# Patient Record
Sex: FEMALE | Race: WHITE | Employment: OTHER | URBAN - NONMETROPOLITAN AREA
[De-identification: names, ages, dates, MRNs, and addresses within clinical notes are randomized per-mention and may not be internally consistent; named-entity substitution may affect disease eponyms.]

---

## 2017-05-23 ENCOUNTER — HOSPITAL ENCOUNTER (OUTPATIENT)
Dept: ULTRASOUND IMAGING | Age: 67
Discharge: HOME | End: 2017-05-23
Payer: MEDICARE

## 2017-05-23 DIAGNOSIS — N28.1 SIMPLE RENAL CYST: ICD-10-CM

## 2017-05-23 PROCEDURE — 76770 US EXAM ABDO BACK WALL COMP: CPT

## 2017-06-13 ENCOUNTER — HOSPITAL ENCOUNTER (OUTPATIENT)
Dept: GENERAL RADIOLOGY | Age: 67
Discharge: HOME | End: 2017-06-13
Payer: MEDICARE

## 2017-06-13 DIAGNOSIS — R05.9 COUGH: ICD-10-CM

## 2017-06-13 PROCEDURE — 71020 XR CHEST STANDARD TWO VW: CPT

## 2017-06-20 RX ORDER — ALPRAZOLAM 1 MG/1
1 TABLET ORAL 2 TIMES DAILY PRN
COMMUNITY

## 2017-06-20 RX ORDER — CHLORAL HYDRATE 500 MG
CAPSULE ORAL
COMMUNITY

## 2017-06-20 RX ORDER — LOSARTAN POTASSIUM 50 MG/1
50 TABLET ORAL DAILY
COMMUNITY
End: 2022-04-07 | Stop reason: SDUPTHER

## 2017-06-20 RX ORDER — ASCORBIC ACID 500 MG
500 TABLET ORAL DAILY
COMMUNITY

## 2017-06-20 RX ORDER — SIMVASTATIN 10 MG
10 TABLET ORAL NIGHTLY
COMMUNITY

## 2017-06-20 RX ORDER — ASPIRIN 81 MG/1
81 TABLET ORAL DAILY
COMMUNITY

## 2017-06-21 ENCOUNTER — OFFICE VISIT (OUTPATIENT)
Dept: GASTROENTEROLOGY | Facility: CLINIC | Age: 67
End: 2017-06-21

## 2017-06-21 VITALS
BODY MASS INDEX: 25.03 KG/M2 | HEART RATE: 76 BPM | SYSTOLIC BLOOD PRESSURE: 130 MMHG | TEMPERATURE: 97.6 F | DIASTOLIC BLOOD PRESSURE: 70 MMHG | HEIGHT: 69 IN | WEIGHT: 169 LBS

## 2017-06-21 DIAGNOSIS — K21.9 GASTROESOPHAGEAL REFLUX DISEASE, ESOPHAGITIS PRESENCE NOT SPECIFIED: Primary | ICD-10-CM

## 2017-06-21 PROCEDURE — 99213 OFFICE O/P EST LOW 20 MIN: CPT | Performed by: NURSE PRACTITIONER

## 2017-06-21 RX ORDER — OMEPRAZOLE 40 MG/1
40 CAPSULE, DELAYED RELEASE ORAL 2 TIMES DAILY
COMMUNITY
End: 2022-10-28 | Stop reason: SDUPTHER

## 2017-06-21 NOTE — PROGRESS NOTES
Chief Complaint   Patient presents with   • Endoscopy     12-24-09 had endo healed gastric ulcer       Subjective     HPI    Last EGD in 2009 showed healed gastric ulcers.  Currently maintained on Prilosec 40 mg bid.  She takes Prilosec on empty stomach.  She has not experienced increase in heartburn or indigestion.  She denies dysphagia or odynophagia.  No BRBPR or melena stools.  No abdominal pain.  She does c/o tickle in her throat that is improving since being placed on antibiotics.  EGD in 2016 cancelled due to improvement in symptoms after increasing PPI to bid.    CScope (Dr Hurtado) 2010 normal procedure (reviewed with pt)      Past Medical History:   Diagnosis Date   • Hypertension        Past Surgical History:   Procedure Laterality Date   • D&C AND LAPAROSCOPY         Outpatient Prescriptions Marked as Taking for the 6/21/17 encounter (Office Visit) with JOEY Ricketts   Medication Sig Dispense Refill   • ALPRAZolam (XANAX) 1 MG tablet Take 1 mg by mouth 2 (Two) Times a Day As Needed for Anxiety.     • aspirin 81 MG EC tablet Take 81 mg by mouth Daily.     • losartan (COZAAR) 50 MG tablet Take 50 mg by mouth Daily.     • omeprazole (priLOSEC) 40 MG capsule Take 40 mg by mouth 2 (Two) Times a Day.     • simvastatin (ZOCOR) 10 MG tablet Take 10 mg by mouth Every Night.         No Known Allergies    Social History     Social History   • Marital status:      Spouse name: N/A   • Number of children: N/A   • Years of education: N/A     Occupational History   • Not on file.     Social History Main Topics   • Smoking status: Never Smoker   • Smokeless tobacco: Never Used   • Alcohol use Yes      Comment: sometime   • Drug use: No   • Sexual activity: Not on file     Other Topics Concern   • Not on file     Social History Narrative       Family History   Problem Relation Age of Onset   • Colon cancer Neg Hx    • Esophageal cancer Neg Hx        Review of Systems   Constitutional: Negative for  "fatigue, fever and unexpected weight change.   HENT: Negative for hearing loss, sore throat and voice change.    Eyes: Negative for visual disturbance.   Respiratory: Negative for cough, shortness of breath and wheezing.    Cardiovascular: Negative for chest pain and palpitations.   Gastrointestinal: Negative for abdominal pain, blood in stool and vomiting.   Endocrine: Negative for polydipsia and polyuria.   Genitourinary: Negative for difficulty urinating, dysuria, hematuria and urgency.   Musculoskeletal: Negative for joint swelling and myalgias.   Skin: Negative for color change, rash and wound.   Neurological: Negative for dizziness, tremors, seizures and syncope.   Hematological: Does not bruise/bleed easily.   Psychiatric/Behavioral: Negative for agitation and confusion. The patient is not nervous/anxious.        Objective     Vitals:    06/21/17 0946   BP: 130/70   Pulse: 76   Temp: 97.6 °F (36.4 °C)   Weight: 169 lb (76.7 kg)   Height: 69\" (175.3 cm)     Body mass index is 24.96 kg/(m^2).    Physical Exam   Constitutional: She is oriented to person, place, and time. She appears well-developed and well-nourished.   HENT:   Head: Normocephalic and atraumatic.   Eyes: Conjunctivae are normal. Pupils are equal, round, and reactive to light. No scleral icterus.   Neck: No JVD present. No thyroid mass and no thyromegaly present.   Cardiovascular: Normal rate, regular rhythm and normal heart sounds.  Exam reveals no gallop and no friction rub.    No murmur heard.  Pulmonary/Chest: Effort normal and breath sounds normal. No accessory muscle usage. No respiratory distress. She has no wheezes. She has no rales.   Abdominal: Soft. Bowel sounds are normal. She exhibits no distension, no ascites and no mass. There is no splenomegaly or hepatomegaly. There is no tenderness. There is no rebound and no guarding.   Genitourinary:   Genitourinary Comments: Rectal-Did not examine   Musculoskeletal: Normal range of motion. She " exhibits no edema.   Neurological: She is alert and oriented to person, place, and time.   Deemed a reliable historian, able to converse without difficulty and able to move all extremities without difficulty   Skin: Skin is warm and dry.   Psychiatric: She has a normal mood and affect. Her behavior is normal.       Imaging Results (most recent)     None          Assessment/Plan     Veronica was seen today for endoscopy.    Diagnoses and all orders for this visit:    Gastroesophageal reflux disease, esophagitis presence not specified    No plans for endoscopic eval at this time, if symptoms worsen/do not improve pt will call office and we will proceed with endoscopy at that time.  This discussed with pt and she was agreeable.  She is to continue bid Prilosec    * Surgery not found *    There are no Patient Instructions on file for this visit.

## 2018-10-11 LAB
ALBUMIN SERPL-MCNC: 4.5 G/DL (ref 3.5–5.2)
ALP BLD-CCNC: 95 U/L (ref 35–104)
ALT SERPL-CCNC: 27 U/L (ref 5–33)
ANION GAP SERPL CALCULATED.3IONS-SCNC: 15 MMOL/L (ref 7–19)
AST SERPL-CCNC: 28 U/L (ref 5–32)
BASOPHILS ABSOLUTE: 0 K/UL (ref 0–0.2)
BASOPHILS RELATIVE PERCENT: 0.7 % (ref 0–1)
BILIRUB SERPL-MCNC: 0.5 MG/DL (ref 0.2–1.2)
BUN BLDV-MCNC: 9 MG/DL (ref 8–23)
CALCIUM SERPL-MCNC: 10.1 MG/DL (ref 8.8–10.2)
CHLORIDE BLD-SCNC: 102 MMOL/L (ref 98–111)
CHOLESTEROL, TOTAL: 180 MG/DL (ref 160–199)
CO2: 26 MMOL/L (ref 22–29)
CREAT SERPL-MCNC: 0.6 MG/DL (ref 0.5–0.9)
EOSINOPHILS ABSOLUTE: 0.1 K/UL (ref 0–0.6)
EOSINOPHILS RELATIVE PERCENT: 1.4 % (ref 0–5)
GFR NON-AFRICAN AMERICAN: >60
GLUCOSE BLD-MCNC: 109 MG/DL (ref 74–109)
HCT VFR BLD CALC: 46.8 % (ref 37–47)
HDLC SERPL-MCNC: 64 MG/DL (ref 65–121)
HEMOGLOBIN: 15.2 G/DL (ref 12–16)
LDL CHOLESTEROL CALCULATED: 89 MG/DL
LYMPHOCYTES ABSOLUTE: 1.8 K/UL (ref 1.1–4.5)
LYMPHOCYTES RELATIVE PERCENT: 31 % (ref 20–40)
MCH RBC QN AUTO: 30.8 PG (ref 27–31)
MCHC RBC AUTO-ENTMCNC: 32.5 G/DL (ref 33–37)
MCV RBC AUTO: 94.7 FL (ref 81–99)
MONOCYTES ABSOLUTE: 0.7 K/UL (ref 0–0.9)
MONOCYTES RELATIVE PERCENT: 12.5 % (ref 0–10)
NEUTROPHILS ABSOLUTE: 3.2 K/UL (ref 1.5–7.5)
NEUTROPHILS RELATIVE PERCENT: 54.2 % (ref 50–65)
PDW BLD-RTO: 12.8 % (ref 11.5–14.5)
PLATELET # BLD: 152 K/UL (ref 130–400)
PMV BLD AUTO: 11.4 FL (ref 9.4–12.3)
POTASSIUM SERPL-SCNC: 4 MMOL/L (ref 3.5–5)
RBC # BLD: 4.94 M/UL (ref 4.2–5.4)
SODIUM BLD-SCNC: 143 MMOL/L (ref 136–145)
TOTAL PROTEIN: 7.8 G/DL (ref 6.6–8.7)
TRIGL SERPL-MCNC: 137 MG/DL (ref 0–149)
TSH SERPL DL<=0.05 MIU/L-ACNC: 0.99 UIU/ML (ref 0.27–4.2)
WBC # BLD: 5.8 K/UL (ref 4.8–10.8)

## 2019-04-04 LAB
ALBUMIN SERPL-MCNC: 4.6 G/DL (ref 3.5–5.2)
ALP BLD-CCNC: 87 U/L (ref 35–104)
ALT SERPL-CCNC: 19 U/L (ref 5–33)
ANION GAP SERPL CALCULATED.3IONS-SCNC: 15 MMOL/L (ref 7–19)
AST SERPL-CCNC: 21 U/L (ref 5–32)
BILIRUB SERPL-MCNC: 0.7 MG/DL (ref 0.2–1.2)
BUN BLDV-MCNC: 11 MG/DL (ref 8–23)
CALCIUM SERPL-MCNC: 9.8 MG/DL (ref 8.8–10.2)
CHLORIDE BLD-SCNC: 101 MMOL/L (ref 98–111)
CHOLESTEROL, TOTAL: 168 MG/DL (ref 160–199)
CO2: 26 MMOL/L (ref 22–29)
CREAT SERPL-MCNC: 0.8 MG/DL (ref 0.5–0.9)
GFR NON-AFRICAN AMERICAN: >60
GLUCOSE BLD-MCNC: 109 MG/DL (ref 74–109)
HDLC SERPL-MCNC: 64 MG/DL (ref 65–121)
LDL CHOLESTEROL CALCULATED: 77 MG/DL
POTASSIUM SERPL-SCNC: 3.9 MMOL/L (ref 3.5–5)
SODIUM BLD-SCNC: 142 MMOL/L (ref 136–145)
TOTAL PROTEIN: 7.9 G/DL (ref 6.6–8.7)
TRIGL SERPL-MCNC: 133 MG/DL (ref 0–149)

## 2019-10-03 LAB
ALBUMIN SERPL-MCNC: 4.5 G/DL (ref 3.5–5.2)
ALP BLD-CCNC: 85 U/L (ref 35–104)
ALT SERPL-CCNC: 16 U/L (ref 5–33)
ANION GAP SERPL CALCULATED.3IONS-SCNC: 14 MMOL/L (ref 7–19)
AST SERPL-CCNC: 19 U/L (ref 5–32)
BILIRUB SERPL-MCNC: 0.4 MG/DL (ref 0.2–1.2)
BUN BLDV-MCNC: 10 MG/DL (ref 8–23)
CALCIUM SERPL-MCNC: 9.7 MG/DL (ref 8.8–10.2)
CHLORIDE BLD-SCNC: 105 MMOL/L (ref 98–111)
CHOLESTEROL, TOTAL: 179 MG/DL (ref 160–199)
CO2: 25 MMOL/L (ref 22–29)
CREAT SERPL-MCNC: 0.7 MG/DL (ref 0.5–0.9)
GFR NON-AFRICAN AMERICAN: >60
GLUCOSE BLD-MCNC: 95 MG/DL (ref 74–109)
HDLC SERPL-MCNC: 65 MG/DL (ref 65–121)
LDL CHOLESTEROL CALCULATED: 92 MG/DL
POTASSIUM SERPL-SCNC: 4.3 MMOL/L (ref 3.5–5)
SODIUM BLD-SCNC: 144 MMOL/L (ref 136–145)
TOTAL PROTEIN: 7.8 G/DL (ref 6.6–8.7)
TRIGL SERPL-MCNC: 111 MG/DL (ref 0–149)

## 2022-04-07 ENCOUNTER — OFFICE VISIT (OUTPATIENT)
Dept: FAMILY MEDICINE CLINIC | Facility: CLINIC | Age: 72
End: 2022-04-07

## 2022-04-07 VITALS
BODY MASS INDEX: 23.25 KG/M2 | DIASTOLIC BLOOD PRESSURE: 74 MMHG | HEIGHT: 69 IN | RESPIRATION RATE: 16 BRPM | SYSTOLIC BLOOD PRESSURE: 130 MMHG | WEIGHT: 157 LBS | HEART RATE: 70 BPM | TEMPERATURE: 98.8 F

## 2022-04-07 DIAGNOSIS — G89.29 CHRONIC RIGHT SHOULDER PAIN: Primary | ICD-10-CM

## 2022-04-07 DIAGNOSIS — M25.511 CHRONIC RIGHT SHOULDER PAIN: Primary | ICD-10-CM

## 2022-04-07 PROCEDURE — 99202 OFFICE O/P NEW SF 15 MIN: CPT | Performed by: FAMILY MEDICINE

## 2022-04-07 RX ORDER — CYCLOBENZAPRINE HCL 10 MG
10 TABLET ORAL AS NEEDED
COMMUNITY

## 2022-04-07 RX ORDER — LOSARTAN POTASSIUM 50 MG/1
50 TABLET ORAL DAILY
Qty: 90 TABLET | Refills: 4 | Status: SHIPPED | OUTPATIENT
Start: 2022-04-07

## 2022-04-07 NOTE — PROGRESS NOTES
"Subjective   Veronica Camacho is a 72 y.o. female.     Chief Complaint   Patient presents with   • Establish Care       History of Present Illness     she notes having right shouder pain since a fall last year--worse with amny mvoement      Current Outpatient Medications:   •  ALPRAZolam (XANAX) 1 MG tablet, Take 1 mg by mouth 2 (Two) Times a Day As Needed for Anxiety., Disp: , Rfl:   •  aspirin 81 MG EC tablet, Take 81 mg by mouth Daily., Disp: , Rfl:   •  losartan (COZAAR) 50 MG tablet, Take 50 mg by mouth Daily., Disp: , Rfl:   •  omeprazole (priLOSEC) 40 MG capsule, Take 40 mg by mouth 2 (Two) Times a Day., Disp: , Rfl:   •  simvastatin (ZOCOR) 10 MG tablet, Take 10 mg by mouth Every Night., Disp: , Rfl:   •  cyclobenzaprine (FLEXERIL) 10 MG tablet, Take 10 mg by mouth As Needed for Muscle Spasms., Disp: , Rfl:   •  Omega-3 Fatty Acids (FISH OIL) 1000 MG capsule capsule, Take  by mouth Daily With Breakfast., Disp: , Rfl:   •  vitamin C (ASCORBIC ACID) 500 MG tablet, Take 500 mg by mouth Daily., Disp: , Rfl:   •  vitamin E 100 UNIT capsule, Take 100 Units by mouth Daily., Disp: , Rfl:   No Known Allergies    Patient's Body mass index is 23.18 kg/m². indicating that she is within normal range (BMI 18.5-24.9). No BMI management plan needed..      Past Medical History:   Diagnosis Date   • Hypertension      Past Surgical History:   Procedure Laterality Date   • D & C AND LAPAROSCOPY         Review of Systems   Constitutional: Negative.    HENT: Negative.    Eyes: Negative.    Respiratory: Negative.    Cardiovascular: Negative.    Gastrointestinal: Negative.    Endocrine: Negative.    Genitourinary: Negative.    Musculoskeletal: Positive for arthralgias and myalgias.   Skin: Negative.    Allergic/Immunologic: Negative.    Neurological: Negative.    Hematological: Negative.    Psychiatric/Behavioral: Negative.        Objective  /74   Pulse 70   Temp 98.8 °F (37.1 °C)   Resp 16   Ht 175.3 cm (69\")   Wt 71.2 kg " (157 lb)   BMI 23.18 kg/m²   Physical Exam  Vitals and nursing note reviewed.   Constitutional:       Appearance: Normal appearance. She is normal weight.   HENT:      Head: Normocephalic and atraumatic.      Nose: Nose normal.      Mouth/Throat:      Mouth: Mucous membranes are moist.   Eyes:      Extraocular Movements: Extraocular movements intact.      Conjunctiva/sclera: Conjunctivae normal.      Pupils: Pupils are equal, round, and reactive to light.   Cardiovascular:      Rate and Rhythm: Normal rate and regular rhythm.      Pulses: Normal pulses.      Heart sounds: Normal heart sounds.   Pulmonary:      Effort: Pulmonary effort is normal.      Breath sounds: Normal breath sounds.   Abdominal:      General: Abdomen is flat. Bowel sounds are normal.      Palpations: Abdomen is soft.   Musculoskeletal:      Cervical back: Normal range of motion and neck supple.   Skin:     General: Skin is warm and dry.      Capillary Refill: Capillary refill takes less than 2 seconds.   Neurological:      General: No focal deficit present.      Mental Status: She is alert and oriented to person, place, and time. Mental status is at baseline.   Psychiatric:         Mood and Affect: Mood normal.         Behavior: Behavior normal.         Thought Content: Thought content normal.         Judgment: Judgment normal.         Assessment/Plan   Diagnoses and all orders for this visit:    1. Chronic right shoulder pain (Primary)  -     XR Shoulder 2+ View Right  -     XR humerus right        She will call for the report         Orders Placed This Encounter   Procedures   • XR Shoulder 2+ View Right     Order Specific Question:   Reason for Exam:     Answer:   right shoulder pain     Order Specific Question:   Does this patient have a diabetic monitoring/medication delivering device on?     Answer:   No     Order Specific Question:   Release to patient     Answer:   Immediate   • XR humerus right     Order Specific Question:   Reason for  Exam:     Answer:   right arm pain     Order Specific Question:   Does this patient have a diabetic monitoring/medication delivering device on?     Answer:   No     Order Specific Question:   Release to patient     Answer:   Immediate       Follow up: 6 week(s)

## 2022-04-12 ENCOUNTER — HOSPITAL ENCOUNTER (OUTPATIENT)
Dept: GENERAL RADIOLOGY | Facility: HOSPITAL | Age: 72
Discharge: HOME | End: 2022-04-12
Admitting: FAMILY MEDICINE

## 2022-04-12 DIAGNOSIS — E78.5 HYPERLIPIDEMIA, UNSPECIFIED HYPERLIPIDEMIA TYPE: Primary | ICD-10-CM

## 2022-04-12 DIAGNOSIS — I10 HYPERTENSION, UNSPECIFIED TYPE: ICD-10-CM

## 2022-04-12 DIAGNOSIS — F41.9 ANXIETY: ICD-10-CM

## 2022-04-12 PROCEDURE — 73030 X-RAY EXAM OF SHOULDER: CPT

## 2022-04-12 PROCEDURE — 73060 X-RAY EXAM OF HUMERUS: CPT

## 2022-04-13 LAB
ALBUMIN SERPL-MCNC: 4.5 G/DL (ref 3.5–5.2)
ALBUMIN/GLOB SERPL: 1.6 G/DL
ALP SERPL-CCNC: 84 U/L (ref 39–117)
ALT SERPL-CCNC: 13 U/L (ref 1–33)
AST SERPL-CCNC: 16 U/L (ref 1–32)
BASOPHILS # BLD AUTO: 0.03 10*3/MM3 (ref 0–0.2)
BASOPHILS NFR BLD AUTO: 0.5 % (ref 0–1.5)
BILIRUB SERPL-MCNC: 0.5 MG/DL (ref 0–1.2)
BUN SERPL-MCNC: 8 MG/DL (ref 8–23)
BUN/CREAT SERPL: 12.5 (ref 7–25)
CALCIUM SERPL-MCNC: 9.7 MG/DL (ref 8.6–10.5)
CHLORIDE SERPL-SCNC: 101 MMOL/L (ref 98–107)
CHOLEST SERPL-MCNC: 157 MG/DL (ref 0–200)
CHOLEST/HDLC SERPL: 2.34 {RATIO}
CO2 SERPL-SCNC: 26.2 MMOL/L (ref 22–29)
CREAT SERPL-MCNC: 0.64 MG/DL (ref 0.57–1)
EGFRCR SERPLBLD CKD-EPI 2021: 94 ML/MIN/1.73
EOSINOPHIL # BLD AUTO: 0.1 10*3/MM3 (ref 0–0.4)
EOSINOPHIL NFR BLD AUTO: 1.5 % (ref 0.3–6.2)
ERYTHROCYTE [DISTWIDTH] IN BLOOD BY AUTOMATED COUNT: 12.4 % (ref 12.3–15.4)
GLOBULIN SER CALC-MCNC: 2.8 GM/DL
GLUCOSE SERPL-MCNC: 100 MG/DL (ref 65–99)
HCT VFR BLD AUTO: 43.4 % (ref 34–46.6)
HDLC SERPL-MCNC: 67 MG/DL (ref 40–60)
HGB BLD-MCNC: 14.2 G/DL (ref 12–15.9)
IMM GRANULOCYTES # BLD AUTO: 0.01 10*3/MM3 (ref 0–0.05)
IMM GRANULOCYTES NFR BLD AUTO: 0.2 % (ref 0–0.5)
LDLC SERPL CALC-MCNC: 72 MG/DL (ref 0–100)
LYMPHOCYTES # BLD AUTO: 2.03 10*3/MM3 (ref 0.7–3.1)
LYMPHOCYTES NFR BLD AUTO: 31.3 % (ref 19.6–45.3)
MCH RBC QN AUTO: 30.1 PG (ref 26.6–33)
MCHC RBC AUTO-ENTMCNC: 32.7 G/DL (ref 31.5–35.7)
MCV RBC AUTO: 92.1 FL (ref 79–97)
MONOCYTES # BLD AUTO: 0.72 10*3/MM3 (ref 0.1–0.9)
MONOCYTES NFR BLD AUTO: 11.1 % (ref 5–12)
NEUTROPHILS # BLD AUTO: 3.6 10*3/MM3 (ref 1.7–7)
NEUTROPHILS NFR BLD AUTO: 55.4 % (ref 42.7–76)
NRBC BLD AUTO-RTO: 0 /100 WBC (ref 0–0.2)
PLATELET # BLD AUTO: 150 10*3/MM3 (ref 140–450)
POTASSIUM SERPL-SCNC: 4.2 MMOL/L (ref 3.5–5.2)
PROT SERPL-MCNC: 7.3 G/DL (ref 6–8.5)
RBC # BLD AUTO: 4.71 10*6/MM3 (ref 3.77–5.28)
SODIUM SERPL-SCNC: 140 MMOL/L (ref 136–145)
TRIGL SERPL-MCNC: 97 MG/DL (ref 0–150)
VLDLC SERPL CALC-MCNC: 18 MG/DL (ref 5–40)
WBC # BLD AUTO: 6.49 10*3/MM3 (ref 3.4–10.8)

## 2022-04-14 DIAGNOSIS — G89.29 CHRONIC SHOULDER PAIN, UNSPECIFIED LATERALITY: Primary | ICD-10-CM

## 2022-04-14 DIAGNOSIS — M25.519 CHRONIC SHOULDER PAIN, UNSPECIFIED LATERALITY: Primary | ICD-10-CM

## 2022-10-28 RX ORDER — OMEPRAZOLE 40 MG/1
40 CAPSULE, DELAYED RELEASE ORAL DAILY
Qty: 30 CAPSULE | Refills: 0 | Status: SHIPPED | OUTPATIENT
Start: 2022-10-28 | End: 2022-10-28

## 2022-10-28 RX ORDER — OMEPRAZOLE 40 MG/1
40 CAPSULE, DELAYED RELEASE ORAL DAILY
Qty: 90 CAPSULE | Refills: 3 | Status: SHIPPED | OUTPATIENT
Start: 2022-10-28

## 2022-10-28 NOTE — TELEPHONE ENCOUNTER
Rx Refill Note  Requested Prescriptions     Pending Prescriptions Disp Refills   • omeprazole (priLOSEC) 40 MG capsule [Pharmacy Med Name: OMEPRAZOLE 40MG CAPSULES] 90 capsule      Sig: TAKE 1 CAPSULE BY MOUTH DAILY      Last office visit with prescribing clinician: 4/7/2022      Next office visit with prescribing clinician: Visit date not found            Robe Campa MA  10/28/22, 09:53 CDT

## 2022-10-28 NOTE — TELEPHONE ENCOUNTER
Caller: Veronica Camacho    Relationship: Self    Best call back number: [phone]    Requested Prescriptions:   Requested Prescriptions     Pending Prescriptions Disp Refills   • omeprazole (priLOSEC) 40 MG capsule       Sig: Take 1 capsule by mouth.        Pharmacy where request should be sent: [id number] [address] AT [address] [facility] - [phone] [facility] [phone] FX     Additional details provided by patient: PATIENT ONLY HAS 1 LEFT    Does the patient have less than a 3 day supply:  [x] Yes  [] No    Mone Culver Rep   10/28/22 08:18 CDT

## 2022-12-20 ENCOUNTER — HOSPITAL ENCOUNTER (OUTPATIENT)
Dept: NON INVASIVE DIAGNOSTICS | Age: 72
Discharge: HOME | End: 2022-12-20
Payer: MEDICARE

## 2022-12-20 PROCEDURE — 93880 EXTRACRANIAL BILAT STUDY: CPT

## 2023-08-18 ENCOUNTER — TELEPHONE (OUTPATIENT)
Dept: FAMILY MEDICINE CLINIC | Facility: CLINIC | Age: 73
End: 2023-08-18

## 2023-08-18 NOTE — TELEPHONE ENCOUNTER
Patient notified she has not been seen in over a year and would have to have an appointment with dr burgess in order for labs to be ordered. Patient declined appointment.

## 2023-08-18 NOTE — TELEPHONE ENCOUNTER
Caller: Veronica Camacho    Relationship: Self    Best call back number: [phone]    Who are you requesting to speak with     CLINICAL STAFF      Do you know the name of the person who called:     VERONICA    What was the call regarding:     WOULD LIKE TO HAVE AN ORDER FOR BLOOD WORK. REQUESTING TO HAVE A1C AS PART OF THAT BLOOD WORK    Is it okay if the provider responds through Smart GPS Backpackhart:   NO    PLEASE CALL AND ADVISE

## 2023-08-31 RX ORDER — LOSARTAN POTASSIUM 50 MG/1
50 TABLET ORAL DAILY
Qty: 90 TABLET | Refills: 4 | Status: SHIPPED | OUTPATIENT
Start: 2023-08-31

## 2024-06-28 ENCOUNTER — OFFICE VISIT (OUTPATIENT)
Dept: FAMILY MEDICINE CLINIC | Facility: CLINIC | Age: 74
End: 2024-06-28
Payer: MEDICARE

## 2024-06-28 VITALS
SYSTOLIC BLOOD PRESSURE: 138 MMHG | TEMPERATURE: 97.3 F | OXYGEN SATURATION: 96 % | HEART RATE: 92 BPM | DIASTOLIC BLOOD PRESSURE: 84 MMHG | BODY MASS INDEX: 24.88 KG/M2 | HEIGHT: 69 IN | RESPIRATION RATE: 18 BRPM | WEIGHT: 168 LBS

## 2024-06-28 DIAGNOSIS — I10 ESSENTIAL HYPERTENSION: Chronic | ICD-10-CM

## 2024-06-28 DIAGNOSIS — Z00.00 PREVENTATIVE HEALTH CARE: ICD-10-CM

## 2024-06-28 DIAGNOSIS — Z12.31 ENCOUNTER FOR SCREENING MAMMOGRAM FOR MALIGNANT NEOPLASM OF BREAST: ICD-10-CM

## 2024-06-28 DIAGNOSIS — M54.50 ACUTE RIGHT-SIDED LOW BACK PAIN WITHOUT SCIATICA: ICD-10-CM

## 2024-06-28 DIAGNOSIS — Z12.11 SCREENING FOR COLON CANCER: Primary | ICD-10-CM

## 2024-06-28 DIAGNOSIS — K21.9 GASTROESOPHAGEAL REFLUX DISEASE WITHOUT ESOPHAGITIS: ICD-10-CM

## 2024-06-28 DIAGNOSIS — E78.2 MIXED HYPERLIPIDEMIA: Chronic | ICD-10-CM

## 2024-06-28 PROCEDURE — 3075F SYST BP GE 130 - 139MM HG: CPT | Performed by: NURSE PRACTITIONER

## 2024-06-28 PROCEDURE — 99214 OFFICE O/P EST MOD 30 MIN: CPT | Performed by: NURSE PRACTITIONER

## 2024-06-28 PROCEDURE — 1126F AMNT PAIN NOTED NONE PRSNT: CPT | Performed by: NURSE PRACTITIONER

## 2024-06-28 PROCEDURE — 3079F DIAST BP 80-89 MM HG: CPT | Performed by: NURSE PRACTITIONER

## 2024-06-28 RX ORDER — OMEPRAZOLE 40 MG/1
40 CAPSULE, DELAYED RELEASE ORAL DAILY
Qty: 90 CAPSULE | Refills: 3 | Status: SHIPPED | OUTPATIENT
Start: 2024-06-28

## 2024-06-28 RX ORDER — CYCLOBENZAPRINE HCL 10 MG
10 TABLET ORAL 3 TIMES DAILY PRN
Qty: 30 TABLET | Refills: 0 | Status: SHIPPED | OUTPATIENT
Start: 2024-06-28

## 2024-06-28 RX ORDER — SIMVASTATIN 10 MG
10 TABLET ORAL NIGHTLY
Qty: 90 TABLET | Refills: 3 | Status: SHIPPED | OUTPATIENT
Start: 2024-06-28

## 2024-06-28 RX ORDER — LOSARTAN POTASSIUM 50 MG/1
50 TABLET ORAL DAILY
Qty: 90 TABLET | Refills: 3 | Status: SHIPPED | OUTPATIENT
Start: 2024-06-28

## 2024-06-28 RX ORDER — METHYLPREDNISOLONE 4 MG/1
TABLET ORAL
Qty: 1 EACH | Refills: 0 | Status: SHIPPED | OUTPATIENT
Start: 2024-06-28

## 2024-06-28 NOTE — PROGRESS NOTES
Subjective   Chief Complaint:  Evaluation of chronic conditions    History of Present Illness:  This 74 y.o. female was seen in the office today.    Preventative health care: Declines mammogram and PCV 20 today-she is willing to proceed with a Cologuard-has been over 10 years since colonoscopy and she reports had no polyps then.    Hyperlipidemia: On Zocor-needing refills, and no muscle pains-due for lab    GERD: Reports slight flareup because has been out of omeprazole otherwise stable with omeprazole    Hypertension: No chest pain palpitations or headaches.  Needing refill on losartan-due for lab    Acute back pain-reports pulled a muscle-interested medicines for comfort-no workup at this point-    Past Medical, Surgical, Social, and Family History:  No Known Allergies   Current Outpatient Medications on File Prior to Visit   Medication Sig    ALPRAZolam (XANAX) 1 MG tablet Take 1 tablet by mouth 2 (Two) Times a Day As Needed for Anxiety.    Omega-3 Fatty Acids (FISH OIL) 1000 MG capsule capsule Take  by mouth Daily With Breakfast.    vitamin C (ASCORBIC ACID) 500 MG tablet Take 1 tablet by mouth Daily.    vitamin E 100 UNIT capsule Take 1 capsule by mouth Daily.    [DISCONTINUED] losartan (COZAAR) 50 MG tablet TAKE 1 TABLET BY MOUTH DAILY    [DISCONTINUED] omeprazole (priLOSEC) 40 MG capsule TAKE 1 CAPSULE BY MOUTH DAILY    [DISCONTINUED] simvastatin (ZOCOR) 10 MG tablet Take 1 tablet by mouth Every Night.    [DISCONTINUED] aspirin 81 MG EC tablet Take 81 mg by mouth Daily. (Patient not taking: Reported on 6/28/2024)    [DISCONTINUED] cyclobenzaprine (FLEXERIL) 10 MG tablet Take 10 mg by mouth As Needed for Muscle Spasms. (Patient not taking: Reported on 6/28/2024)     No current facility-administered medications on file prior to visit.      Past Medical History:   Diagnosis Date    Hypertension       Past Surgical History:   Procedure Laterality Date    D & C AND LAPAROSCOPY        Social History  "    Socioeconomic History    Marital status:    Tobacco Use    Smoking status: Never     Passive exposure: Never    Smokeless tobacco: Never   Vaping Use    Vaping status: Never Used   Substance and Sexual Activity    Alcohol use: Yes     Comment: sometime    Drug use: No    Sexual activity: Defer      Family History   Problem Relation Age of Onset    Colon cancer Neg Hx     Esophageal cancer Neg Hx        Objective   Vital Signs  /84 (BP Location: Left arm, Patient Position: Sitting, Cuff Size: Large Adult)   Pulse 92   Temp 97.3 °F (36.3 °C) (Infrared)   Resp 18   Ht 175.3 cm (69\")   Wt 76.2 kg (168 lb)   SpO2 96%   BMI 24.81 kg/m²   Physical Exam  Vitals reviewed.   Constitutional:       General: She is not in acute distress.     Appearance: Normal appearance.   Neck:      Vascular: No carotid bruit.   Cardiovascular:      Rate and Rhythm: Normal rate and regular rhythm.      Pulses:           Dorsalis pedis pulses are 2+ on the right side and 2+ on the left side.        Posterior tibial pulses are 2+ on the right side and 2+ on the left side.   Pulmonary:      Effort: Pulmonary effort is normal.      Breath sounds: Normal breath sounds.   Musculoskeletal:         General: Tenderness (*Left low back) present.      Right lower leg: No edema.      Left lower leg: No edema.       Assessment & Plan   Diagnoses and all orders for this visit:    1. Screening for colon cancer (Primary)  -     Cologuard - Stool, Per Rectum; Future    2. Encounter for screening mammogram for malignant neoplasm of breast  Comments:  Offered mammogram - declined    3. Mixed hyperlipidemia  Comments:  Chronic stable  Orders:  -     simvastatin (ZOCOR) 10 MG tablet; Take 1 tablet by mouth Every Night.  Dispense: 90 tablet; Refill: 3  -     Lipid Panel    4. Gastroesophageal reflux disease without esophagitis  Comments:  Chronic-exacerbated  Orders:  -     omeprazole (priLOSEC) 40 MG capsule; Take 1 capsule by mouth Daily.  " Dispense: 90 capsule; Refill: 3    5. Essential hypertension  Comments:  Chronic stable-fair control  Orders:  -     losartan (COZAAR) 50 MG tablet; Take 1 tablet by mouth Daily.  Dispense: 90 tablet; Refill: 3  -     CBC & Differential  -     Comprehensive Metabolic Panel  -     TSH    6. Acute right-sided low back pain without sciatica  Comments:  Acute noncomplicated  Orders:  -     cyclobenzaprine (FLEXERIL) 10 MG tablet; Take 1 tablet by mouth 3 (Three) Times a Day As Needed for Muscle Spasms.  Dispense: 30 tablet; Refill: 0  -     methylPREDNISolone (MEDROL) 4 MG dose pack; Take as directed on package instructions.  Dispense: 1 each; Refill: 0    7. Preventative health care  Comments:  -offered/declined pcv-20    Plan:  Advised and educated plan of care.    Advise continue meds as ordered-refills are sent.  She will get updated fasting labs.    Follow-up Plan:  The patient will Return in about 6 months (around 12/28/2024) for AWV (make sure 366 days from last).    Records and Results Review:  I performed a routine review of chart including medication list and allergy list.    BMI is within normal parameters. No other follow-up for BMI required.      Electronically signed by JOEY Solis, 06/28/24, 1:41 PM CDT.

## 2024-07-06 LAB
ALBUMIN SERPL-MCNC: 4.6 G/DL (ref 3.5–5.2)
ALBUMIN/GLOB SERPL: 1.9 G/DL
ALP SERPL-CCNC: 91 U/L (ref 39–117)
ALT SERPL-CCNC: 15 U/L (ref 1–33)
AST SERPL-CCNC: 23 U/L (ref 1–32)
BASOPHILS # BLD AUTO: 0.03 10*3/MM3 (ref 0–0.2)
BASOPHILS NFR BLD AUTO: 0.5 % (ref 0–1.5)
BILIRUB SERPL-MCNC: 0.6 MG/DL (ref 0–1.2)
BUN SERPL-MCNC: 8 MG/DL (ref 8–23)
BUN/CREAT SERPL: 10.1 (ref 7–25)
CALCIUM SERPL-MCNC: 9.5 MG/DL (ref 8.6–10.5)
CHLORIDE SERPL-SCNC: 100 MMOL/L (ref 98–107)
CHOLEST SERPL-MCNC: 165 MG/DL (ref 0–200)
CO2 SERPL-SCNC: 25.4 MMOL/L (ref 22–29)
CREAT SERPL-MCNC: 0.79 MG/DL (ref 0.57–1)
EGFRCR SERPLBLD CKD-EPI 2021: 78.6 ML/MIN/1.73
EOSINOPHIL # BLD AUTO: 0.06 10*3/MM3 (ref 0–0.4)
EOSINOPHIL NFR BLD AUTO: 1 % (ref 0.3–6.2)
ERYTHROCYTE [DISTWIDTH] IN BLOOD BY AUTOMATED COUNT: 12.6 % (ref 12.3–15.4)
GLOBULIN SER CALC-MCNC: 2.4 GM/DL
GLUCOSE SERPL-MCNC: 102 MG/DL (ref 65–99)
HCT VFR BLD AUTO: 45.2 % (ref 34–46.6)
HDLC SERPL-MCNC: 63 MG/DL (ref 40–60)
HGB BLD-MCNC: 15 G/DL (ref 12–15.9)
IMM GRANULOCYTES # BLD AUTO: 0.02 10*3/MM3 (ref 0–0.05)
IMM GRANULOCYTES NFR BLD AUTO: 0.3 % (ref 0–0.5)
LDLC SERPL CALC-MCNC: 83 MG/DL (ref 0–100)
LYMPHOCYTES # BLD AUTO: 2.13 10*3/MM3 (ref 0.7–3.1)
LYMPHOCYTES NFR BLD AUTO: 35 % (ref 19.6–45.3)
MCH RBC QN AUTO: 30.5 PG (ref 26.6–33)
MCHC RBC AUTO-ENTMCNC: 33.2 G/DL (ref 31.5–35.7)
MCV RBC AUTO: 92.1 FL (ref 79–97)
MONOCYTES # BLD AUTO: 0.93 10*3/MM3 (ref 0.1–0.9)
MONOCYTES NFR BLD AUTO: 15.3 % (ref 5–12)
NEUTROPHILS # BLD AUTO: 2.92 10*3/MM3 (ref 1.7–7)
NEUTROPHILS NFR BLD AUTO: 47.9 % (ref 42.7–76)
NRBC BLD AUTO-RTO: 0 /100 WBC (ref 0–0.2)
PLATELET # BLD AUTO: 188 10*3/MM3 (ref 140–450)
POTASSIUM SERPL-SCNC: 3.8 MMOL/L (ref 3.5–5.2)
PROT SERPL-MCNC: 7 G/DL (ref 6–8.5)
RBC # BLD AUTO: 4.91 10*6/MM3 (ref 3.77–5.28)
SODIUM SERPL-SCNC: 139 MMOL/L (ref 136–145)
TRIGL SERPL-MCNC: 103 MG/DL (ref 0–150)
TSH SERPL DL<=0.005 MIU/L-ACNC: 0.71 UIU/ML (ref 0.27–4.2)
VLDLC SERPL CALC-MCNC: 19 MG/DL (ref 5–40)
WBC # BLD AUTO: 6.09 10*3/MM3 (ref 3.4–10.8)

## 2024-07-08 ENCOUNTER — TELEPHONE (OUTPATIENT)
Dept: FAMILY MEDICINE CLINIC | Facility: CLINIC | Age: 74
End: 2024-07-08
Payer: MEDICARE

## 2024-07-09 ENCOUNTER — TELEPHONE (OUTPATIENT)
Dept: FAMILY MEDICINE CLINIC | Facility: CLINIC | Age: 74
End: 2024-07-09
Payer: MEDICARE

## 2024-07-09 NOTE — TELEPHONE ENCOUNTER
----- Message from Caleb Leos sent at 7/8/2024  6:52 AM CDT -----  Please call result -labs are all good.    Electronically signed by JOEY Solis, 07/08/24, 6:52 AM CDT.

## 2024-12-03 ENCOUNTER — TELEPHONE (OUTPATIENT)
Dept: FAMILY MEDICINE CLINIC | Facility: CLINIC | Age: 74
End: 2024-12-03

## 2024-12-03 NOTE — TELEPHONE ENCOUNTER
Caller: Veronica Camacho    Relationship to patient: Self    Best call back number: [phone]     Chief complaint: RESCHEDULING FROM 12-2-24 MED CHECK, BLOOD WORK    Type of visit: OFFICE    Requested date: ANY THU  TOWARDS FROM END OF JAN OR FEB AFTER LUNCH TIME. NOT ON FEB 6TH     If rescheduling, when is the original appointment: 12-2-24    Additional notes: WANTS IT WITH DR. HAZEL

## 2025-01-30 ENCOUNTER — OFFICE VISIT (OUTPATIENT)
Dept: FAMILY MEDICINE CLINIC | Facility: CLINIC | Age: 75
End: 2025-01-30
Payer: MEDICARE

## 2025-01-30 VITALS
SYSTOLIC BLOOD PRESSURE: 138 MMHG | HEART RATE: 111 BPM | HEIGHT: 69 IN | WEIGHT: 167 LBS | BODY MASS INDEX: 24.73 KG/M2 | OXYGEN SATURATION: 98 % | DIASTOLIC BLOOD PRESSURE: 82 MMHG

## 2025-01-30 DIAGNOSIS — E78.2 MIXED HYPERLIPIDEMIA: Chronic | ICD-10-CM

## 2025-01-30 DIAGNOSIS — F32.1 CURRENT MODERATE EPISODE OF MAJOR DEPRESSIVE DISORDER WITHOUT PRIOR EPISODE: ICD-10-CM

## 2025-01-30 DIAGNOSIS — E78.2 MIXED HYPERLIPIDEMIA: ICD-10-CM

## 2025-01-30 DIAGNOSIS — R53.83 FATIGUE, UNSPECIFIED TYPE: ICD-10-CM

## 2025-01-30 DIAGNOSIS — I10 ESSENTIAL HYPERTENSION: Primary | ICD-10-CM

## 2025-01-30 PROCEDURE — 3079F DIAST BP 80-89 MM HG: CPT

## 2025-01-30 PROCEDURE — 3075F SYST BP GE 130 - 139MM HG: CPT

## 2025-01-30 PROCEDURE — G2211 COMPLEX E/M VISIT ADD ON: HCPCS

## 2025-01-30 PROCEDURE — 99214 OFFICE O/P EST MOD 30 MIN: CPT

## 2025-01-30 PROCEDURE — 1126F AMNT PAIN NOTED NONE PRSNT: CPT

## 2025-01-30 RX ORDER — SIMVASTATIN 20 MG
20 TABLET ORAL NIGHTLY
Qty: 90 TABLET | Refills: 1 | Status: SHIPPED | OUTPATIENT
Start: 2025-01-30

## 2025-01-30 RX ORDER — ALPRAZOLAM 0.25 MG/1
0.25 TABLET ORAL 2 TIMES DAILY PRN
Qty: 10 TABLET | Refills: 0 | Status: SHIPPED | OUTPATIENT
Start: 2025-01-30

## 2025-01-30 NOTE — PROGRESS NOTES
Chief Complaint  Hyperlipidemia and Hypertension    Subjective      Veronica Camacho presents to [facility]  History of Present Illness  The patient is a 75-year-old female who is here today for a follow-up visit.    She reports experiencing generalized body aches and fatigue, which have progressively worsened over time. She has difficulty rising from the toilet seat, dressing herself, and standing up from a chair. Despite maintaining a daily walking routine, she finds it increasingly challenging to navigate concrete surfaces and parking garages. She also reports severe back pain. She has a senior policy that will hire help for her to have somebody come and help her do things at her house. She is requesting a disabled sticker for her car. She has been under significant stress due to the illness of a close friend, which has necessitated frequent hospital visits. She has been on Xanax intermittently for the past 20 years, taking half a tablet as needed, particularly during periods of high stress. She reports good sleep quality but notes a decreased appetite since her friend's illness, resulting in a weight loss of 3 to 4 pounds. She has never sought counseling or taken antidepressant medication. She lives alone and has limited social interaction, primarily communicating with her brothers via text messages. She has no history of suicidal ideation.    She has been on cholesterol medication and has a family history of diabetes. She comes every 4 to 6 months for blood work. She is scheduled for lab work tomorrow.    She has been on cholesterol medication and has a family history of diabetes. She comes every 4 to 6 months for blood work. She is scheduled for lab work tomorrow.    She has been on cholesterol medication and has a family history of diabetes. She comes every 4 to 6 months for blood work. She is scheduled for lab work tomorrow.    FAMILY HISTORY  The patient reports that diabetes  "runs in her family.    MEDICATIONS  Current: simvastatin, Xanax, Prilosec      Objective   Vital Signs:  /82   Pulse 111   Ht 175.3 cm (69\")   Wt 75.8 kg (167 lb)   SpO2 98%   BMI 24.66 kg/m²   Estimated body mass index is 24.66 kg/m² as calculated from the following:    Height as of this encounter: 175.3 cm (69\").    Weight as of this encounter: 75.8 kg (167 lb).    BMI is within normal parameters. No other follow-up for BMI required.      Physical Exam     Physical Exam        Result Review :  The following labs/imaging/notes were reviewed and discussed with the patient by Obi wKan MD on 01/30/2025:   Latest Reference Range & Units 07/05/24 07:40   Sodium 136 - 145 mmol/L 139   Potassium 3.5 - 5.2 mmol/L 3.8   Chloride 98 - 107 mmol/L 100   CO2 22.0 - 29.0 mmol/L 25.4   BUN 8 - 23 mg/dL 8   Creatinine 0.57 - 1.00 mg/dL 0.79   BUN/Creatinine Ratio 7.0 - 25.0  10.1   EGFR Result >60.0 mL/min/1.73 78.6   Glucose 65 - 99 mg/dL 102 (H)   Calcium 8.6 - 10.5 mg/dL 9.5   Alkaline Phosphatase 39 - 117 U/L 91   Total Protein 6.0 - 8.5 g/dL 7.0   Albumin 3.5 - 5.2 g/dL 4.6   A/G Ratio g/dL 1.9   AST (SGOT) 1 - 32 U/L 23   ALT (SGPT) 1 - 33 U/L 15   Total Bilirubin 0.0 - 1.2 mg/dL 0.6   TSH Baseline 0.270 - 4.200 uIU/mL 0.708   Total Cholesterol 0 - 200 mg/dL 165   HDL Cholesterol 40 - 60 mg/dL 63 (H)   LDL Cholesterol  0 - 100 mg/dL 83   Triglycerides 0 - 150 mg/dL 103   VLDL Cholesterol Azael 5 - 40 mg/dL 19   Globulin gm/dL 2.4   WBC 3.40 - 10.80 10*3/mm3 6.09   RBC 3.77 - 5.28 10*6/mm3 4.91   Hemoglobin 12.0 - 15.9 g/dL 15.0   Hematocrit 34.0 - 46.6 % 45.2   Platelets 140 - 450 10*3/mm3 188   RDW 12.3 - 15.4 % 12.6   MCV 79.0 - 97.0 fL 92.1   MCH 26.6 - 33.0 pg 30.5   MCHC 31.5 - 35.7 g/dL 33.2   Neutrophil Rel % 42.7 - 76.0 % 47.9   Lymphocyte Rel % 19.6 - 45.3 % 35.0   Monocyte Rel % 5.0 - 12.0 % 15.3 (H)   Eosinophil Rel % 0.3 - 6.2 % 1.0   Basophil Rel % 0.0 - 1.5 % 0.5   Immature Granulocyte Rel % 0.0 - " 0.5 % 0.3   Neutrophils Absolute 1.70 - 7.00 10*3/mm3 2.92   Lymphocytes Absolute 0.70 - 3.10 10*3/mm3 2.13   Monocytes Absolute 0.10 - 0.90 10*3/mm3 0.93 (H)   Eosinophils Absolute 0.00 - 0.40 10*3/mm3 0.06   Basophils Absolute 0.00 - 0.20 10*3/mm3 0.03   Immature Grans, Absolute 0.00 - 0.05 10*3/mm3 0.02   nRBC 0.0 - 0.2 /100 WBC 0.0   (H): Data is abnormally high          Assessment      Diagnoses and all orders for this visit:    1. Essential hypertension (Primary)    2. Mixed hyperlipidemia    3. Fatigue, unspecified type    4. Current moderate episode of major depressive disorder without prior episode    5. Mixed hyperlipidemia  Comments:  Chronic stable             Assessment & Plan  1. Generalized body aches and fatigue.  Her symptoms may be attributed to a combination of physiological changes associated with aging, potential depression, and environmental factors such as weather conditions. A comprehensive metabolic panel (CMP), B12, folate, vitamin D, TSH, lipid panel, and CBC will be ordered to rule out any underlying conditions. She will continue her current medication regimen for blood pressure management.    2. Depression.  Fatigue and other symptoms could be attributed to depression. Reports various life stressors. Was tearful in room. . She will be initiated on fluoxetine 20 mg once daily. A prescription for Xanax 0.25 mg, limited to 10 pills, will be provided to aid in managing her symptoms until the fluoxetine takes effect. She is encouraged to seek professional counseling to help cope with her grief and loneliness. A general referral to behavioral health will be placed. If she experiences side effects from fluoxetine, she should inform us promptly so that an alternative medication can be considered.    3. Cholesterol management.  Her most recent cholesterol levels were within normal limits. Her cholesterol levels will be reassessed today. She will continue her current regimen of simvastatin 10 mg,  with a 90-day supply provided. If her cholesterol levels are not adequately controlled, the dosage of simvastatin may be increased.        Follow-up  The patient will follow up in 6 weeks.    No orders of the defined types were placed in this encounter.      Follow Up   Return in about 6 weeks (around 3/13/2025) for depression w/ new med.  Patient was given instructions and counseling regarding her condition or for health maintenance advice. Please see specific information pulled into the AVS if appropriate.         Patient or patient representative verbalized consent for the use of Ambient Listening during the visit with  Obi Kwan MD for chart documentation. 1/30/2025  14:06 CST

## 2025-02-04 LAB
ALBUMIN SERPL-MCNC: 4.1 G/DL (ref 3.5–5.2)
ALBUMIN/GLOB SERPL: 1.4 G/DL
ALP SERPL-CCNC: 105 U/L (ref 39–117)
ALT SERPL-CCNC: 21 U/L (ref 1–33)
AST SERPL-CCNC: 33 U/L (ref 1–32)
BILIRUB SERPL-MCNC: 0.6 MG/DL (ref 0–1.2)
BUN SERPL-MCNC: 7 MG/DL (ref 8–23)
BUN/CREAT SERPL: 8.3 (ref 7–25)
CALCIUM SERPL-MCNC: 9.5 MG/DL (ref 8.6–10.5)
CHLORIDE SERPL-SCNC: 101 MMOL/L (ref 98–107)
CHOLEST SERPL-MCNC: 159 MG/DL (ref 0–200)
CO2 SERPL-SCNC: 25.1 MMOL/L (ref 22–29)
CREAT SERPL-MCNC: 0.84 MG/DL (ref 0.57–1)
EGFRCR SERPLBLD CKD-EPI 2021: 72.6 ML/MIN/1.73
FOLATE SERPL-MCNC: >20 NG/ML (ref 4.78–24.2)
GLOBULIN SER CALC-MCNC: 3 GM/DL
GLUCOSE SERPL-MCNC: 98 MG/DL (ref 65–99)
HDLC SERPL-MCNC: 61 MG/DL (ref 40–60)
LDLC SERPL CALC-MCNC: 81 MG/DL (ref 0–100)
POTASSIUM SERPL-SCNC: 4 MMOL/L (ref 3.5–5.2)
PROT SERPL-MCNC: 7.1 G/DL (ref 6–8.5)
SODIUM SERPL-SCNC: 138 MMOL/L (ref 136–145)
TRIGL SERPL-MCNC: 92 MG/DL (ref 0–150)
TSH SERPL DL<=0.005 MIU/L-ACNC: 1.26 UIU/ML (ref 0.27–4.2)
VIT B12 SERPL-MCNC: 1513 PG/ML (ref 211–946)
VLDLC SERPL CALC-MCNC: 17 MG/DL (ref 5–40)

## 2025-02-11 ENCOUNTER — TELEPHONE (OUTPATIENT)
Dept: FAMILY MEDICINE CLINIC | Facility: CLINIC | Age: 75
End: 2025-02-11

## 2025-02-11 NOTE — TELEPHONE ENCOUNTER
Caller: Veronica Camacho    Relationship: Self    Best call back number: [phone]     Caller requesting test results: SELF    What test was performed: BLOOD WORK    When was the test performed: 2-3-25    Where was the test performed: IN OFFICE     Additional notes: WOULD LIKE RESULTS CALL  AROUND 1P.

## 2025-03-14 ENCOUNTER — OFFICE VISIT (OUTPATIENT)
Dept: FAMILY MEDICINE CLINIC | Facility: CLINIC | Age: 75
End: 2025-03-14
Payer: MEDICARE

## 2025-03-14 VITALS
BODY MASS INDEX: 24.88 KG/M2 | HEART RATE: 104 BPM | OXYGEN SATURATION: 98 % | DIASTOLIC BLOOD PRESSURE: 102 MMHG | HEIGHT: 69 IN | SYSTOLIC BLOOD PRESSURE: 160 MMHG | WEIGHT: 168 LBS

## 2025-03-14 DIAGNOSIS — G47.00 INSOMNIA, UNSPECIFIED TYPE: Primary | ICD-10-CM

## 2025-03-14 DIAGNOSIS — F32.1 CURRENT MODERATE EPISODE OF MAJOR DEPRESSIVE DISORDER WITHOUT PRIOR EPISODE: ICD-10-CM

## 2025-03-14 DIAGNOSIS — R53.1 WEAKNESS GENERALIZED: ICD-10-CM

## 2025-03-14 DIAGNOSIS — I10 ESSENTIAL HYPERTENSION: ICD-10-CM

## 2025-03-14 PROBLEM — G47.9 TROUBLE IN SLEEPING: Status: ACTIVE | Noted: 2025-03-14

## 2025-03-14 PROBLEM — G47.9 TROUBLE IN SLEEPING: Status: RESOLVED | Noted: 2025-03-14 | Resolved: 2025-03-14

## 2025-03-14 RX ORDER — LOSARTAN POTASSIUM 100 MG/1
100 TABLET ORAL DAILY
Qty: 90 TABLET | Refills: 1 | Status: SHIPPED | OUTPATIENT
Start: 2025-03-14

## 2025-03-14 NOTE — PROGRESS NOTES
Chief Complaint  Depression and Home Health Care ([facility] is the home health she needs referral to.  Fax # [phone])    Subjective      Veronica Camacho presents to [facility]  History of Present Illness  The patient is a 75-year-old female who is here today for a follow-up on depression, insomnia, hand tingling, and hypertension.    She was last seen on 2025 and started on fluoxetine 20 mg once daily. She reports an improvement in her emotional state compared to her previous visit. She does not believe she is currently experiencing depression. However, she experienced severe constipation and cognitive impairment after 6 days of fluoxetine use, leading her to discontinue the medication. She also took Xanax, half a tablet in the morning and half before a , which she found beneficial.    She has been experiencing sleep disturbances since her last visit, with a nightly sleep duration of approximately 2.5 to 3 hours. Her bedtime varies depending on her fatigue level, sometimes retiring as early as 7:00 PM but waking up around 10:00 PM. She reports no respiratory issues at night. She has abstained from coffee consumption. She has attempted to manage her insomnia with Tylenol PM, which provided some relief, and Headache PM, which was ineffective. She has also tried melatonin, which did not yield any benefits.    She reports a tingling sensation in her hand following a blood draw on 2025. She describes the sensation as similar to when a hand falls asleep and then wakes up. She expresses concern about potential nerve damage.    Her blood pressure readings at home have been within normal limits. She took her antihypertensive medication this morning.    Supplemental Information  She has severe allergies, which started about 6 years ago. She experiences mucus and blowing her nose in the morning, but it stops after about an hour. She takes Allegra, which usually  "helps.    FAMILY HISTORY  Her mother and father both had hypertension.    ALLERGIES  The patient has no known allergies.    MEDICATIONS  Current: fluoxetine, Xanax, Allegra, losartan, melatonin, Tylenol PM, Headache PM      Objective   Vital Signs:  BP (!) 160/102   Pulse 104   Ht 175.3 cm (69\")   Wt 76.2 kg (168 lb)   SpO2 98%   BMI 24.81 kg/m²   Estimated body mass index is 24.81 kg/m² as calculated from the following:    Height as of this encounter: 175.3 cm (69\").    Weight as of this encounter: 76.2 kg (168 lb).    BMI is within normal parameters. No other follow-up for BMI required.      Physical Exam     Physical Exam  Vital Signs  Blood pressure is 160/102, earlier it was 138/82.      Result Review :  The following labs/imaging/notes were reviewed and discussed with the patient by Obi Kwan MD on 03/14/2025:   Latest Reference Range & Units 07/05/24 07:40 02/03/25 09:27   Sodium 136 - 145 mmol/L 139 138   Potassium 3.5 - 5.2 mmol/L 3.8 4.0   Chloride 98 - 107 mmol/L 100 101   CO2 22.0 - 29.0 mmol/L 25.4 25.1   BUN 8 - 23 mg/dL 8 7 (L)   Creatinine 0.57 - 1.00 mg/dL 0.79 0.84   BUN/Creatinine Ratio 7.0 - 25.0  10.1 8.3   EGFR Result >60.0 mL/min/1.73 78.6 72.6   Glucose 65 - 99 mg/dL 102 (H) 98   Calcium 8.6 - 10.5 mg/dL 9.5 9.5   Alkaline Phosphatase 39 - 117 U/L 91 105   Total Protein 6.0 - 8.5 g/dL 7.0 7.1   Albumin 3.5 - 5.2 g/dL 4.6 4.1   A/G Ratio g/dL 1.9 1.4   AST (SGOT) 1 - 32 U/L 23 33 (H)   ALT (SGPT) 1 - 33 U/L 15 21   Total Bilirubin 0.0 - 1.2 mg/dL 0.6 0.6   TSH Baseline 0.270 - 4.200 uIU/mL 0.708 1.260   Vitamin B-12 211 - 946 pg/mL  1,513 (H)   Folate 4.78 - 24.20 ng/mL  >20.00   Total Cholesterol 0 - 200 mg/dL 165 159   HDL Cholesterol 40 - 60 mg/dL 63 (H) 61 (H)   LDL Cholesterol  0 - 100 mg/dL 83 81   Triglycerides 0 - 150 mg/dL 103 92   VLDL Cholesterol Azael 5 - 40 mg/dL 19 17   Globulin gm/dL 2.4 3.0   WBC 3.40 - 10.80 10*3/mm3 6.09    RBC 3.77 - 5.28 10*6/mm3 4.91    Hemoglobin " 12.0 - 15.9 g/dL 15.0    Hematocrit 34.0 - 46.6 % 45.2    Platelets 140 - 450 10*3/mm3 188    RDW 12.3 - 15.4 % 12.6    MCV 79.0 - 97.0 fL 92.1    MCH 26.6 - 33.0 pg 30.5    MCHC 31.5 - 35.7 g/dL 33.2    Neutrophil Rel % 42.7 - 76.0 % 47.9    Lymphocyte Rel % 19.6 - 45.3 % 35.0    Monocyte Rel % 5.0 - 12.0 % 15.3 (H)    Eosinophil Rel % 0.3 - 6.2 % 1.0    Basophil Rel % 0.0 - 1.5 % 0.5    Immature Granulocyte Rel % 0.0 - 0.5 % 0.3    Neutrophils Absolute 1.70 - 7.00 10*3/mm3 2.92    Lymphocytes Absolute 0.70 - 3.10 10*3/mm3 2.13    Monocytes Absolute 0.10 - 0.90 10*3/mm3 0.93 (H)    Eosinophils Absolute 0.00 - 0.40 10*3/mm3 0.06    Basophils Absolute 0.00 - 0.20 10*3/mm3 0.03    Immature Grans, Absolute 0.00 - 0.05 10*3/mm3 0.02    nRBC 0.0 - 0.2 /100 WBC 0.0    (L): Data is abnormally low  (H): Data is abnormally high          Assessment      Diagnoses and all orders for this visit:    1. Insomnia, unspecified type (Primary)    2. Essential hypertension    3. Current moderate episode of major depressive disorder without prior episode  -     Ambulatory Referral to Behavioral Health    4. Weakness generalized    Other orders  -     losartan (COZAAR) 100 MG tablet; Take 1 tablet by mouth Daily.  Dispense: 90 tablet; Refill: 1             Assessment & Plan  1. Depression.  She reports feeling emotionally better since the last visit. She experienced significant constipation and brain fog with fluoxetine 20 mg, leading her to discontinue it. She will remain off fluoxetine for now. If her symptoms worsen, she is advised to contact the office.    2. Insomnia.  She reports difficulty sleeping, averaging only 2.5 to 3 hours per night since the last visit. She has been advised to practice good sleep hygiene, including avoiding screens an hour before bed, not eating right before bed, and avoiding coffee 4-5 hours before sleep. She is also advised to stay hydrated. She will try melatonin again, taking it 30-40 minutes before  bedtime consistently. If there is no improvement after a few weeks, she should inform the office for further evaluation and potential alternative medication. She is advised not to use Xanax for sleep due to the risk of dependency.    3. Hand tingling.  She reports persistent tingling in her hand since a blood draw on 02/03/2025. She is advised to monitor the symptoms and give it time to resolve. If the tingling persists beyond a month, she should notify the office.    4. Hypertension.  Her blood pressure was elevated at 160/102 today, with a previous reading of 138/82. Her lab results are within normal limits. The dosage of losartan will be increased from 50 mg to 100 mg. She can take two 50 mg tablets until her current supply is exhausted. She is advised to monitor her blood pressure at home several times a week, record the readings, and report them to the office.    Orders Placed This Encounter   Procedures    Ambulatory Referral to Behavioral Health     Referral Priority:   Routine     Referral Type:   Behavorial Health/Psych     Referral Reason:   Specialty Services Required     Requested Specialty:   Behavioral Health     Number of Visits Requested:   1       Follow Up   Return in about 6 weeks (around 4/25/2025) for Depression post therapy, Fatigue w/ PT/IT discussion, f/u on home health, HTN w/ new med. .  Patient was given instructions and counseling regarding her condition or for health maintenance advice. Please see specific information pulled into the AVS if appropriate.         Patient or patient representative verbalized consent for the use of Ambient Listening during the visit with  Obi Kwan MD for chart documentation. 3/16/2025  14:15 CDT

## 2025-04-18 ENCOUNTER — TELEPHONE (OUTPATIENT)
Dept: FAMILY MEDICINE CLINIC | Facility: CLINIC | Age: 75
End: 2025-04-18
Payer: MEDICARE
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
## 2025-06-09 ENCOUNTER — TELEPHONE (OUTPATIENT)
Dept: FAMILY MEDICINE CLINIC | Facility: CLINIC | Age: 75
End: 2025-06-09

## 2025-06-09 NOTE — TELEPHONE ENCOUNTER
Caller: Veronica Camacho    Relationship to patient: Self    Best call back number: [phone]     Chief complaint: TICK BITE ON NECK LAST WEEK. BUMP HAS GOTTEN BIGGER AND RASH      Type of visit: OFFICE    Requested date: 6-10-25  PREFERS AFTERNOON       Additional notes: DOESN'T WANT 6-9-25. NOTHING UNTIL 7-19-25. WILLING TO SEE ANYONE

## 2025-06-11 ENCOUNTER — OFFICE VISIT (OUTPATIENT)
Dept: FAMILY MEDICINE CLINIC | Facility: CLINIC | Age: 75
End: 2025-06-11
Payer: MEDICARE

## 2025-06-11 VITALS
HEART RATE: 92 BPM | OXYGEN SATURATION: 97 % | HEIGHT: 69 IN | DIASTOLIC BLOOD PRESSURE: 82 MMHG | SYSTOLIC BLOOD PRESSURE: 144 MMHG | BODY MASS INDEX: 25.18 KG/M2 | WEIGHT: 170 LBS

## 2025-06-11 DIAGNOSIS — S10.96XA TICK BITE OF NECK, INITIAL ENCOUNTER: Primary | ICD-10-CM

## 2025-06-11 DIAGNOSIS — L30.9 DERMATITIS: ICD-10-CM
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
## 2025-06-11 RX ORDER — BENZOCAINE/MENTHOL 6 MG-10 MG
1 LOZENGE MUCOUS MEMBRANE 2 TIMES DAILY
Qty: 20 G | Refills: 1 | Status: SHIPPED | OUTPATIENT
Start: 2025-06-11

## 2025-06-11 NOTE — PROGRESS NOTES
"Chief Complaint  Tick Removal (Tick bite on back of neck, would like to make sure she got it all out.   Pt does say it is better but she still has the rash.  Pt states it has been a week ago Monday.  Pt took some left over antibiotic she had, amoxicillin 500 mg.)    Subjective      Veronica Camacho presents to [facility]  History of Present Illness  The patient is a 75-year-old female who presents today for follow-up on a tick bite.    She reports an incident of a tick bite that occurred approximately 9 days ago, with the tick being discovered on her neck 2 days later. The tick was attached to her skin and did not dislodge during hair washing. She describes the tick as small and brown, with no noticeable spot. She has been monitoring her temperature daily, except for today, and reports no fever. She also reports no flu-like symptoms, chills, or fatigue. She has experienced mild headaches, which she attributes to sinus issues, but these are not persistent. She reports no joint pain or changes in her shampoo. She has been experiencing sweating due to the heat.     Following the removal of the tick, she developed a rash on the back of her neck. She has been applying Neosporin to the area and self-administered 21 tablets of amoxicillin 500 mg, which were previously prescribed by her dentist. She has been using cortisone cream and antibiotic ointment on the tick bite. She has no history of rashes associated with antibiotic use or any known history of dermatitis.      Objective   Vital Signs:  /82   Pulse 92   Ht 175.3 cm (69\")   Wt 77.1 kg (170 lb)   SpO2 97%   BMI 25.10 kg/m²   Estimated body mass index is 25.1 kg/m² as calculated from the following:    Height as of this encounter: 175.3 cm (69\").    Weight as of this encounter: 77.1 kg (170 lb).      Physical Exam     Physical Exam  Neck: Rash present on the back of the neck behind ear. No rash around bite.       Result Review " :  The following labs/imaging/notes were reviewed and discussed with the patient by Obi Kwan MD on 06/11/2025:            Assessment      Diagnoses and all orders for this visit:    1. Tick bite of neck, initial encounter (Primary)    2. Dermatitis    Other orders  -     hydrocortisone 1 % cream; Apply 1 Application topically to the appropriate area as directed 2 (Two) Times a Day.  Dispense: 20 g; Refill: 1             Assessment & Plan  1. Tick bite.  - The rash appears to be more consistent with contact dermatitis rather than a typical tick rash.  - Increased rash and itching behind the ears, no fever, chills, or joint pain.  - Advised to monitor for symptoms such as fatigue, tiredness, or viral symptoms over the next few days.  - Prescription for hydrocortisone cream provided to alleviate itching; instructed to report any changes in condition, including worsening of rash, development of fevers, body aches, or chills.    Follow-up  The patient will follow up on 08/07/2025.    No orders of the defined types were placed in this encounter.      Follow Up   No follow-ups on file.  Patient was given instructions and counseling regarding her condition or for health maintenance advice. Please see specific information pulled into the AVS if appropriate.         Patient or patient representative verbalized consent for the use of Ambient Listening during the visit with  Obi Kwan MD for chart documentation. 6/11/2025  12:18 CDT

## 2025-06-25 DIAGNOSIS — K21.9 GASTROESOPHAGEAL REFLUX DISEASE WITHOUT ESOPHAGITIS: ICD-10-CM

## 2025-06-25 RX ORDER — OMEPRAZOLE 40 MG/1
40 CAPSULE, DELAYED RELEASE ORAL DAILY
Qty: 90 CAPSULE | Refills: 3 | Status: SHIPPED | OUTPATIENT
Start: 2025-06-25 | End: 2025-06-30

## 2025-06-25 NOTE — TELEPHONE ENCOUNTER
Caller: JaniceVeronica    Relationship: Self    Best call back number: [phone]     Requested Prescriptions:   Requested Prescriptions     Pending Prescriptions Disp Refills    omeprazole (priLOSEC) 40 MG capsule 90 capsule 3     Sig: Take 1 capsule by mouth Daily.        Pharmacy where request should be sent: [id number] - [address] AT [facility] [address] [facility] [phone] [facility] [phone] FX     Last office visit with prescribing clinician: 6/11/2025   Last telemedicine visit with prescribing clinician: Visit date not found   Next office visit with prescribing clinician: 8/7/2025     Additional details provided by patient:     Does the patient have less than a 3 day supply:  [] Yes  [x] No    Would you like a call back once the refill request has been completed: [] Yes [] No    If the office needs to give you a call back, can they leave a voicemail: [] Yes [] No    Mone Lamar Rep   06/25/25 10:14 CDT

## 2025-06-28 DIAGNOSIS — K21.9 GASTROESOPHAGEAL REFLUX DISEASE WITHOUT ESOPHAGITIS: ICD-10-CM

## 2025-06-28 DIAGNOSIS — I10 ESSENTIAL HYPERTENSION: Chronic | ICD-10-CM

## 2025-06-28 DIAGNOSIS — E78.2 MIXED HYPERLIPIDEMIA: Chronic | ICD-10-CM

## 2025-06-30 RX ORDER — LOSARTAN POTASSIUM 50 MG/1
50 TABLET ORAL DAILY
Qty: 90 TABLET | Refills: 3 | Status: SHIPPED | OUTPATIENT
Start: 2025-06-30

## 2025-06-30 RX ORDER — OMEPRAZOLE 40 MG/1
40 CAPSULE, DELAYED RELEASE ORAL DAILY
Qty: 90 CAPSULE | Refills: 3 | Status: SHIPPED | OUTPATIENT
Start: 2025-06-30

## 2025-06-30 RX ORDER — SIMVASTATIN 20 MG
20 TABLET ORAL NIGHTLY
Qty: 90 TABLET | Refills: 1 | Status: SHIPPED | OUTPATIENT
Start: 2025-06-30

## 2025-08-21 ENCOUNTER — OFFICE VISIT (OUTPATIENT)
Dept: FAMILY MEDICINE CLINIC | Facility: CLINIC | Age: 75
End: 2025-08-21
Payer: MEDICARE

## 2025-08-21 VITALS
BODY MASS INDEX: 25.18 KG/M2 | DIASTOLIC BLOOD PRESSURE: 58 MMHG | HEIGHT: 69 IN | HEART RATE: 93 BPM | OXYGEN SATURATION: 97 % | SYSTOLIC BLOOD PRESSURE: 122 MMHG | WEIGHT: 170 LBS

## 2025-08-21 DIAGNOSIS — E78.2 MIXED HYPERLIPIDEMIA: ICD-10-CM

## 2025-08-21 DIAGNOSIS — I10 ESSENTIAL HYPERTENSION: Primary | ICD-10-CM

## 2025-08-21 DIAGNOSIS — F32.1 CURRENT MODERATE EPISODE OF MAJOR DEPRESSIVE DISORDER WITHOUT PRIOR EPISODE: ICD-10-CM

## 2025-08-21 DIAGNOSIS — K21.9 GASTROESOPHAGEAL REFLUX DISEASE WITHOUT ESOPHAGITIS: ICD-10-CM
